# Patient Record
Sex: MALE | Race: WHITE | NOT HISPANIC OR LATINO | URBAN - NONMETROPOLITAN AREA
[De-identification: names, ages, dates, MRNs, and addresses within clinical notes are randomized per-mention and may not be internally consistent; named-entity substitution may affect disease eponyms.]

---

## 2020-06-27 ENCOUNTER — WEB ENCOUNTER (OUTPATIENT)
Dept: URBAN - NONMETROPOLITAN AREA CLINIC 2 | Facility: CLINIC | Age: 70
End: 2020-06-27

## 2021-01-29 ENCOUNTER — OFFICE VISIT (OUTPATIENT)
Dept: URBAN - METROPOLITAN AREA TELEHEALTH 2 | Facility: TELEHEALTH | Age: 71
End: 2021-01-29
Payer: MEDICARE

## 2021-01-29 DIAGNOSIS — R10.84 ABDOMINAL PAIN, GENERALIZED: ICD-10-CM

## 2021-01-29 DIAGNOSIS — K59.09 OTHER CONSTIPATION: ICD-10-CM

## 2021-01-29 DIAGNOSIS — R11.0 NAUSEA: ICD-10-CM

## 2021-01-29 DIAGNOSIS — Z12.11 COLON CANCER SCREENING: ICD-10-CM

## 2021-01-29 PROCEDURE — G8427 DOCREV CUR MEDS BY ELIG CLIN: HCPCS | Performed by: INTERNAL MEDICINE

## 2021-01-29 PROCEDURE — 99213 OFFICE O/P EST LOW 20 MIN: CPT | Performed by: INTERNAL MEDICINE

## 2021-01-29 PROCEDURE — 3017F COLORECTAL CA SCREEN DOC REV: CPT | Performed by: INTERNAL MEDICINE

## 2021-01-29 RX ORDER — ATORVASTATIN CALCIUM 20 MG/1
TAKE 1 TABLET (20 MG) BY ORAL ROUTE ONCE DAILY TABLET, FILM COATED ORAL 1
Qty: 0 | Refills: 0 | COMMUNITY
Start: 1900-01-01

## 2021-01-29 RX ORDER — NALDEMEDINE 0.2 MG/1
1 TABLET TABLET ORAL ONCE A DAY
Status: ACTIVE | COMMUNITY

## 2021-01-29 RX ORDER — ATENOLOL 25 MG/1
TAKE 1 TABLET (25 MG) BY ORAL ROUTE ONCE DAILY TABLET ORAL 1
Qty: 0 | Refills: 0 | COMMUNITY
Start: 1900-01-01

## 2021-01-29 RX ORDER — ASPIRIN 81 MG/1
CHEW 1 TABLET (81 MG) BY ORAL ROUTE ONCE DAILY TABLET, CHEWABLE ORAL 1
Qty: 0 | Refills: 0 | COMMUNITY
Start: 1900-01-01

## 2021-01-29 RX ORDER — ONDANSETRON HYDROCHLORIDE 4 MG/1
1 TABLET EVERY 6-8HRS AS NEEDED FOR NAUSEA TABLET, FILM COATED ORAL
Qty: 30 | Refills: 3 | OUTPATIENT
Start: 2021-01-29

## 2021-01-29 RX ORDER — OXYCODONE HYDROCHLORIDE 20 MG/1
TAKE 1 TABLET (20 MG) BY ORAL ROUTE EVERY 6 HOURS TABLET ORAL
Qty: 0 | Refills: 0 | COMMUNITY
Start: 1900-01-01

## 2021-01-29 RX ORDER — ZOLPIDEM TARTRATE 10 MG/1
TAKE 1 TABLET (10 MG) BY ORAL ROUTE ONCE DAILY AT BEDTIME TABLET, FILM COATED ORAL 1
Qty: 0 | Refills: 0 | COMMUNITY
Start: 1900-01-01

## 2021-01-29 NOTE — HPI-TODAY'S VISIT:
1/29/2021 Mr. Jaguar Hurley is a 70 year old male evaluated via telehealth for nausea and abdominal pain. He was last seen in 2019 for constipation and abdominal pain. The pain was related to his constipation and improved over time with Symproic. Over the last several months, he has been having on and off nausea after his evening meal. This improves after he goes to bed. He will wake and feel better. He has noticed that salad and high fat foods make it worse. He saw an ENT for voice changs and started on omeprazole and then prevacid 15mg. This did not change his nausea. He denies any vomiting. This has been more on than off. He has a hx of nausea related to anxiety and his sister was recently intubated. He wonders if some of his medications are atrributing and has been playing the his dose of symproic and his oxycodone which has helped some. He has tried phenergan but this is very sedating. CS

## 2021-01-29 NOTE — HPI-OTHER HISTORIES
History Of Present Illness    Mr. Jaguar Hurley is here for constipation and abdominal pain. He has been taking pain medication for back issues. He has been dealing with constipation and tried many samples of different medications provided by primary care. He has not found one that worked well. He has a BM every 1-2 days. This is often a very small fragmented bowel movement. He has started having random gnawing pains. These are often periumbilical and RLQ. The pains do improve with passing gas or having a BM. He has no pains in the middle of the night. He had a colonoscopy last year that was normal except for diverticulosis and a TA polyp. At his last OV, he was started on colace and miralax. This helped for a few days and then stopped. He got symproic approved by his insurance company. He took one dose and felt like he was having withdrawals. He did not take another dose but he had normal BMs for about 4-5 days. He wants to try and wean off the narcotics. He also got a stomach virus and went to the ER. He had a CT scan with enteritis. Since this time, his abdominal pain has resolved.  He has had a chest pain since 2012. This is brought on by exertion. He has had a cardiac work up and lung work up. He saw Dr. Golden and had an EGD many years ago and started on omeprazole. He is not sure if this helped the pain. He had been taking ibuprofen in the evenings. He stopped this several months ago. He was restarted on omeprazole 40mg daily. His CT scan showed wallthickening of the esophagus. His chest pain has been less frequent with the omeprazole.    6/11/19  Since last vist EGD was performd that ws normal. no esophagitis or other sympotos. Today he is having improvement in his constipation by taking activia daily. He has seen his dentist/PCP and oral surgeon about a small bluish/red spot on the back of his tongue.They have not expressed concern a bout this. He is seeing ent soon

## 2023-07-03 ENCOUNTER — TELEPHONE ENCOUNTER (OUTPATIENT)
Dept: URBAN - NONMETROPOLITAN AREA CLINIC 2 | Facility: CLINIC | Age: 73
End: 2023-07-03

## 2023-07-04 ENCOUNTER — WEB ENCOUNTER (OUTPATIENT)
Dept: URBAN - NONMETROPOLITAN AREA CLINIC 2 | Facility: CLINIC | Age: 73
End: 2023-07-04

## 2023-07-10 ENCOUNTER — LAB OUTSIDE AN ENCOUNTER (OUTPATIENT)
Dept: URBAN - NONMETROPOLITAN AREA CLINIC 2 | Facility: CLINIC | Age: 73
End: 2023-07-10

## 2023-07-10 ENCOUNTER — OFFICE VISIT (OUTPATIENT)
Dept: URBAN - NONMETROPOLITAN AREA CLINIC 2 | Facility: CLINIC | Age: 73
End: 2023-07-10
Payer: MEDICARE

## 2023-07-10 ENCOUNTER — WEB ENCOUNTER (OUTPATIENT)
Dept: URBAN - NONMETROPOLITAN AREA CLINIC 2 | Facility: CLINIC | Age: 73
End: 2023-07-10

## 2023-07-10 VITALS
HEART RATE: 66 BPM | WEIGHT: 177 LBS | DIASTOLIC BLOOD PRESSURE: 77 MMHG | SYSTOLIC BLOOD PRESSURE: 117 MMHG | HEIGHT: 73 IN | BODY MASS INDEX: 23.46 KG/M2

## 2023-07-10 DIAGNOSIS — K59.09 CHANGE IN BOWEL MOVEMENTS INTERMITTENT CONSTIPATION. URGENCY IN THE MORNING.: ICD-10-CM

## 2023-07-10 DIAGNOSIS — R10.84 ABDOMINAL CRAMPING, GENERALIZED: ICD-10-CM

## 2023-07-10 DIAGNOSIS — K57.90 DIVERTICULOSIS: ICD-10-CM

## 2023-07-10 DIAGNOSIS — Z12.11 COLON CANCER SCREENING: ICD-10-CM

## 2023-07-10 PROCEDURE — 99214 OFFICE O/P EST MOD 30 MIN: CPT

## 2023-07-10 RX ORDER — MORPHINE SULFATE 15 MG/1
1 TABLET TABLET, FILM COATED, EXTENDED RELEASE ORAL
Status: ACTIVE | COMMUNITY

## 2023-07-10 RX ORDER — POLYETHYLENE GLYCOL 3350, SODIUM SULFATE ANHYDROUS, SODIUM BICARBONATE, SODIUM CHLORIDE, POTASSIUM CHLORIDE 236; 22.74; 6.74; 5.86; 2.97 G/4L; G/4L; G/4L; G/4L; G/4L
4000ML POWDER, FOR SOLUTION ORAL ONCE
Qty: 1 UNSPECIFIED | Refills: 0 | OUTPATIENT
Start: 2023-07-10 | End: 2023-07-11

## 2023-07-10 RX ORDER — ATENOLOL 25 MG/1
TAKE 1 TABLET (25 MG) BY ORAL ROUTE ONCE DAILY TABLET ORAL 1
Qty: 0 | Refills: 0 | COMMUNITY
Start: 1900-01-01

## 2023-07-10 RX ORDER — EZETIMIBE 10 MG/1
1 TABLET TABLET ORAL ONCE A DAY
Status: ACTIVE | COMMUNITY

## 2023-07-10 RX ORDER — ZOLPIDEM TARTRATE 10 MG/1
TAKE 1 TABLET (10 MG) BY ORAL ROUTE ONCE DAILY AT BEDTIME TABLET, FILM COATED ORAL 1
Qty: 0 | Refills: 0 | COMMUNITY
Start: 1900-01-01

## 2023-07-10 RX ORDER — NALDEMEDINE 0.2 MG/1
1 TABLET TABLET ORAL ONCE A DAY
Status: ACTIVE | COMMUNITY

## 2023-07-10 RX ORDER — ONDANSETRON HYDROCHLORIDE 4 MG/1
1 TABLET EVERY 6-8HRS AS NEEDED FOR NAUSEA TABLET, FILM COATED ORAL
Qty: 30 | Refills: 3 | Status: ON HOLD | COMMUNITY
Start: 2021-01-29

## 2023-07-10 RX ORDER — ASPIRIN 81 MG/1
CHEW 1 TABLET (81 MG) BY ORAL ROUTE ONCE DAILY TABLET, CHEWABLE ORAL 1
Qty: 0 | Refills: 0 | COMMUNITY
Start: 1900-01-01

## 2023-07-10 RX ORDER — ATORVASTATIN CALCIUM 20 MG/1
TAKE 1 TABLET (20 MG) BY ORAL ROUTE ONCE DAILY TABLET, FILM COATED ORAL 1
Qty: 0 | Refills: 0 | COMMUNITY
Start: 1900-01-01

## 2023-07-10 RX ORDER — SILODOSIN 8 MG/1
1 CAPSULE WITH A MEAL CAPSULE, GELATIN COATED ORAL ONCE A DAY
Status: ACTIVE | COMMUNITY

## 2023-07-10 RX ORDER — PREGABALIN 150 MG/1
1 CAPSULE CAPSULE ORAL ONCE A DAY
Status: ACTIVE | COMMUNITY

## 2023-07-10 RX ORDER — OXYCODONE HYDROCHLORIDE 20 MG/1
TAKE 1 TABLET (20 MG) BY ORAL ROUTE EVERY 6 HOURS TABLET ORAL
Qty: 0 | Refills: 0 | Status: ON HOLD | COMMUNITY
Start: 1900-01-01

## 2023-07-10 NOTE — HPI-TODAY'S VISIT:
7/10/2023 Mr. Hurley returns for management for Constipation with narcotics as well as hx of colon polyps. He did experience right sided abdominal pain after likely pulling a muscle carrying a box of boost on his hip.  and then Saw Urgent Care at [place] and had series of xrays he states were non-concerning.  History of colon cancer in his family he continues with repeat screening colonoscopies. His Nephew just  with colon cancer in his 40's ,included with another nephew now.  Also has a grandfather with colon cancer . He has a bowel regimen that is working for him including stool softeners and Miralax, has improved his nausea as well. He is having average of 2 BMs. per day.  Continues with Cardiology Dr. Walker for CAD medically managed sees him once yearly on ASA States history of long QT syndrome, with physical activity as concerning  factor SP   2021 Mr. Jaguar Hurley is a 70 year old male evaluated via telehealth for nausea and abdominal pain. He was last seen in 2019 for constipation and abdominal pain. The pain was related to his constipation and improved over time with Symproic. Over the last several months, he has been having on and off nausea after his evening meal. This improves after he goes to bed. He will wake and feel better. He has noticed that salad and high fat foods make it worse. He saw an ENT for voice changs and started on omeprazole and then prevacid 15mg. This did not change his nausea. He denies any vomiting. This has been more on than off. He has a hx of nausea related to anxiety and his sister was recently intubated. He wonders if some of his medications are atrributing and has been playing the his dose of symproic and his oxycodone which has helped some. He has tried phenergan but this is very sedating. CS

## 2023-08-08 ENCOUNTER — TELEPHONE ENCOUNTER (OUTPATIENT)
Dept: URBAN - NONMETROPOLITAN AREA CLINIC 2 | Facility: CLINIC | Age: 73
End: 2023-08-08

## 2023-10-16 ENCOUNTER — TELEPHONE ENCOUNTER (OUTPATIENT)
Dept: URBAN - NONMETROPOLITAN AREA CLINIC 2 | Facility: CLINIC | Age: 73
End: 2023-10-16

## 2023-10-20 ENCOUNTER — OFFICE VISIT (OUTPATIENT)
Dept: URBAN - METROPOLITAN AREA MEDICAL CENTER 1 | Facility: MEDICAL CENTER | Age: 73
End: 2023-10-20
Payer: MEDICARE

## 2023-10-20 DIAGNOSIS — Z86.010 ADENOMAS PERSONAL HISTORY OF COLONIC POLYPS: ICD-10-CM

## 2023-10-20 PROCEDURE — G0105 COLORECTAL SCRN; HI RISK IND: HCPCS | Performed by: INTERNAL MEDICINE

## 2023-11-29 ENCOUNTER — TELEPHONE ENCOUNTER (OUTPATIENT)
Dept: URBAN - NONMETROPOLITAN AREA CLINIC 2 | Facility: CLINIC | Age: 73
End: 2023-11-29

## 2023-12-01 ENCOUNTER — DASHBOARD ENCOUNTERS (OUTPATIENT)
Age: 73
End: 2023-12-01

## 2023-12-01 ENCOUNTER — OFFICE VISIT (OUTPATIENT)
Dept: URBAN - NONMETROPOLITAN AREA CLINIC 13 | Facility: CLINIC | Age: 73
End: 2023-12-01
Payer: MEDICARE

## 2023-12-01 ENCOUNTER — LAB OUTSIDE AN ENCOUNTER (OUTPATIENT)
Dept: URBAN - NONMETROPOLITAN AREA CLINIC 13 | Facility: CLINIC | Age: 73
End: 2023-12-01

## 2023-12-01 VITALS
BODY MASS INDEX: 24.12 KG/M2 | SYSTOLIC BLOOD PRESSURE: 149 MMHG | HEART RATE: 68 BPM | HEIGHT: 73 IN | WEIGHT: 182 LBS | DIASTOLIC BLOOD PRESSURE: 79 MMHG

## 2023-12-01 DIAGNOSIS — K57.50 DIVERTICULOSIS OF BOTH SMALL AND LARGE INTESTINE: ICD-10-CM

## 2023-12-01 DIAGNOSIS — K59.09 CHRONIC CONSTIPATION: ICD-10-CM

## 2023-12-01 DIAGNOSIS — R11.0 NAUSEA: ICD-10-CM

## 2023-12-01 DIAGNOSIS — R10.84 ABDOMINAL PAIN, GENERALIZED: ICD-10-CM

## 2023-12-01 PROBLEM — 397881000: Status: ACTIVE | Noted: 2023-07-10

## 2023-12-01 PROCEDURE — 99214 OFFICE O/P EST MOD 30 MIN: CPT | Performed by: NURSE PRACTITIONER

## 2023-12-01 RX ORDER — ASPIRIN 81 MG/1
CHEW 1 TABLET (81 MG) BY ORAL ROUTE ONCE DAILY TABLET, CHEWABLE ORAL 1
Qty: 0 | Refills: 0 | Status: ACTIVE | COMMUNITY
Start: 1900-01-01

## 2023-12-01 RX ORDER — PREGABALIN 150 MG/1
1 CAPSULE CAPSULE ORAL ONCE A DAY
Status: ACTIVE | COMMUNITY

## 2023-12-01 RX ORDER — ATORVASTATIN CALCIUM 20 MG/1
TAKE 1 TABLET (20 MG) BY ORAL ROUTE ONCE DAILY TABLET, FILM COATED ORAL 1
Qty: 0 | Refills: 0 | COMMUNITY
Start: 1900-01-01

## 2023-12-01 RX ORDER — MORPHINE SULFATE 15 MG/1
1 TABLET TABLET, FILM COATED, EXTENDED RELEASE ORAL
Status: ACTIVE | COMMUNITY

## 2023-12-01 RX ORDER — EZETIMIBE 10 MG/1
1 TABLET TABLET ORAL ONCE A DAY
Status: ACTIVE | COMMUNITY

## 2023-12-01 RX ORDER — OXYCODONE HYDROCHLORIDE 20 MG/1
TAKE 1 TABLET (20 MG) BY ORAL ROUTE EVERY 6 HOURS TABLET ORAL
Qty: 0 | Refills: 0 | Status: ON HOLD | COMMUNITY
Start: 1900-01-01

## 2023-12-01 RX ORDER — ATENOLOL 25 MG/1
TAKE 1 TABLET (25 MG) BY ORAL ROUTE ONCE DAILY TABLET ORAL 1
Qty: 0 | Refills: 0 | COMMUNITY
Start: 1900-01-01

## 2023-12-01 RX ORDER — ONDANSETRON HYDROCHLORIDE 4 MG/1
1 TABLET EVERY 6-8HRS AS NEEDED FOR NAUSEA TABLET, FILM COATED ORAL
Qty: 30 | Refills: 3 | Status: ON HOLD | COMMUNITY
Start: 2021-01-29

## 2023-12-01 RX ORDER — NALDEMEDINE 0.2 MG/1
1 TABLET TABLET ORAL ONCE A DAY
Status: ACTIVE | COMMUNITY

## 2023-12-01 RX ORDER — SILODOSIN 8 MG/1
1 CAPSULE WITH A MEAL CAPSULE, GELATIN COATED ORAL ONCE A DAY
Status: ACTIVE | COMMUNITY

## 2023-12-01 RX ORDER — METHOCARBAMOL 750 MG/1
1 TABLET TABLET ORAL
Status: ACTIVE | COMMUNITY

## 2023-12-01 RX ORDER — ZOLPIDEM TARTRATE 10 MG/1
TAKE 1 TABLET (10 MG) BY ORAL ROUTE ONCE DAILY AT BEDTIME TABLET, FILM COATED ORAL 1
Qty: 0 | Refills: 0 | COMMUNITY
Start: 1900-01-01

## 2023-12-01 NOTE — HPI-OTHER HISTORIES
History Of Present Illness    Mr. Jaguar Hurley is here for constipation and abdominal pain. He has been taking pain medication for back issues. He has been dealing with constipation and tried many samples of different medications provided by primary care. He has not found one that worked well. He has a BM every 1-2 days. This is often a very small fragmented bowel movement. He has started having random gnawing pains. These are often periumbilical and RLQ. The pains do improve with passing gas or having a BM. He has no pains in the middle of the night. He had a colonoscopy last year that was normal except for diverticulosis and a TA polyp. At his last OV, he was started on colace and miralax. This helped for a few days and then stopped. He got symproic approved by his insurance company. He took one dose and felt like he was having withdrawals. He did not take another dose but he had normal BMs for about 4-5 days. He wants to try and wean off the narcotics. He also got a stomach virus and went to the ER. He had a CT scan with enteritis. Since this time, his abdominal pain has resolved.  He has had a chest pain since 2012. This is brought on by exertion. He has had a cardiac work up and lung work up. He saw Dr. Golden and had an EGD many years ago and started on omeprazole. He is not sure if this helped the pain. He had been taking ibuprofen in the evenings. He stopped this several months ago. He was restarted on omeprazole 40mg daily. His CT scan showed wallthickening of the esophagus. His chest pain has been less frequent with the omeprazole.    19  Since last vist EGD was performd that ws normal. no esophagitis or other sympotos. Today he is having improvement in his constipation by taking activia daily. He has seen his dentist/PCP and oral surgeon about a small bluish/red spot on the back of his tongue.They have not expressed concern a bout this. He is seeing ent soon  2021 Mr. Jaguar Hurley is a 70 year old male evaluated via telehealth for nausea and abdominal pain. He was last seen in 2019 for constipation and abdominal pain. The pain was related to his constipation and improved over time with Symproic. Over the last several months, he has been having on and off nausea after his evening meal. This improves after he goes to bed. He will wake and feel better. He has noticed that salad and high fat foods make it worse. He saw an ENT for voice changs and started on omeprazole and then prevacid 15mg. This did not change his nausea. He denies any vomiting. This has been more on than off. He has a hx of nausea related to anxiety and his sister was recently intubated. He wonders if some of his medications are atrributing and has been playing the his dose of symproic and his oxycodone which has helped some. He has tried phenergan but this is very sedating. CS   7/10/2023 Mr. Hurley returns for management for Constipation with narcotics as well as hx of colon polyps. He did experience right sided abdominal pain after likely pulling a muscle carrying a box of boost on his hip. and then Saw Urgent Care at [place] and had series of xrays he states were non-concerning. History of colon cancer in his family he continues with repeat screening colonoscopies. His Nephew just  with colon cancer in his 40's ,included with another nephew now. Also has a grandfather with colon cancer . He has a bowel regimen that is working for him including stool softeners and Miralax, has improved his nausea as well. He is having average of 2 BMs. per day. Continues with Cardiology Dr. Walker for CAD medically managed sees him once yearly on ASA States history of long QT syndrome, with physical activity as concerning factor SP  2023 Colonoscopy: normal except for looping

## 2023-12-01 NOTE — HPI-TODAY'S VISIT:
12/1/2023 Mr. Hurley is here for change in bowel habits. He was last seen in July. His constipation due to narcotics was well controlled with colace and miralax. A month prior to his colonoscopy, his stool became looser and softer. He had a colonoscopy with looping otherwise normal. Today, his bowels are back to being constipated and he is back using miralax, colace, and symproic. He was just scared he may have a blockage. He had one 6-7 years ago and had to go by EMS to the hospital. CS

## 2023-12-11 ENCOUNTER — WEB ENCOUNTER (OUTPATIENT)
Dept: URBAN - NONMETROPOLITAN AREA CLINIC 13 | Facility: CLINIC | Age: 73
End: 2023-12-11

## 2024-11-22 ENCOUNTER — WEB ENCOUNTER (OUTPATIENT)
Dept: URBAN - NONMETROPOLITAN AREA CLINIC 2 | Facility: CLINIC | Age: 74
End: 2024-11-22